# Patient Record
(demographics unavailable — no encounter records)

---

## 2024-12-16 NOTE — PHYSICAL EXAM
[de-identified] :  physical exam of her right knee: She has swelling of the knee.  She has diffuse ecchymosis over the tibia.  Positive patella facet tenderness.  Positive patella grind.  She can actively straight leg raise.  Decreased range of motion.  Positive medial and lateral joint tenderness.  Pain over the lateral tibial plateau.  The rest of exam is limited secondary to pain and swelling.
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
## 2024-12-16 NOTE — DATA REVIEWED
[FreeTextEntry1] : 3 x-ray views taken in the office today of her right knee show a questionable avulsion of the lateral tibial plateau.

## 2024-12-16 NOTE — DISCUSSION/SUMMARY
[de-identified] : She will ice, rest, and elevate her knee.  I recommend she purchases a hinged knee brace.  I wrote her prescription for an MRI to rule out any internal derangement specifically an ACL tear.  I provided her with crutches to assist with ambulation.  I prescribed her ibuprofen 800mg 3 times daily for the inflammation and tramadol 50 mg to take as needed for pain.  She will follow-up in about 2 to 3 weeks with Dr. Thomas or Dr. Rogers for repeat evaluation.  All questions were answered today.  She will contact the office if she has any questions or concerns.
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
## 2025-02-25 NOTE — PROCEDURE
[FreeTextEntry3] : Surgery, INES, right knee arthroscopy ACL reconstruction with allograft internal brace partial lateral meniscectomy First postop remove staples Steri-Stripped wounds start formal therapy for range of motion gait training weight-bear as tolerated, modalities close chain strengthening straight leg raising

## 2025-03-05 NOTE — IMAGING
[de-identified] : Physical exam right knee: Trace effusion.  The surgical wounds are healing well.  Clean, dry, intact.  No surrounding erythema or drainage noted.  Flexion to 40 degrees, she lacks a few degrees full extension.  Excellent endpoint with Lachman testing.  No calf pain, negative Homans' sign.  Intact to light touch, she is ambulate with crutches today.

## 2025-03-05 NOTE — DISCUSSION/SUMMARY
[de-identified] : Today the surgical staples were removed.  She can weight-bear as tolerated.  She was taught formal physical therapy for range of motion, stretching, closed chain strengthening.  We will set her up for a postoperative Amber brace.  She will remain in the brace at all times except for hygiene.  The brace will remain open during the daytime and locked in full extension at night.  I will see her back in 3 weeks for further evaluation and for range of motion check.

## 2025-03-05 NOTE — HISTORY OF PRESENT ILLNESS
[de-identified] : The patient is a 28-year-old female here for reevaluation of her right knee.  She is status post right knee ACL reconstruction using allograft and internal bracing with partial lateral meniscectomy on 2/25/2025.  This is her initial postoperative visit.  She is having some soreness in the knee.  She presents today in the knee immobilizer and using crutches to ambulate.

## 2025-04-16 NOTE — DISCUSSION/SUMMARY
[de-identified] : Today her postoperative Amber brace was discontinued.  She will continue formal physical therapy for range of motion, stretching, closed chain strengthening and modalities.  I will see her back in 4 weeks for further evaluation and to advance her therapy to include light jogging.  She will remain out of sports.

## 2025-04-16 NOTE — IMAGING
[de-identified] : Physical exam of the right knee: No effusion, no erythema or ecchymosis.  She can straight leg raise.  Flexion to 120 degrees, full extension.  No tenderness to palpation over the joint lines.  Excellent endpoint to Lachman testing.  Intact to light touch, nonantalgic gait.

## 2025-04-16 NOTE — HISTORY OF PRESENT ILLNESS
[de-identified] : The patient is a 28-year-old female here for a subsequent reevaluation of her right knee.  She is status post right knee arthroscopy, ACL reconstruction using allograft and internal brace with partial lateral meniscectomy on 2/25/2025.  She is 7 weeks out from her surgery.  She is doing formal physical therapy J[facility] 1.  No pain in the knee.  She is wearing her postoperative [place] brace. no history of blood product transfusion

## 2025-05-20 NOTE — IMAGING
[de-identified] : Physical exam of the right knee: No effusion, no erythema or ecchymosis.  She can straight leg raise.  Flexion to 125 degrees, full extension.  No tenderness to palpation over the joint lines.  Excellent endpoint to Lachman testing.  Intact to light touch, nonantalgic gait.

## 2025-05-20 NOTE — DISCUSSION/SUMMARY
[de-identified] : Today her therapy is advanced to include light jogging.  New prescription provided for that.  I will see her back in 2 months for further evaluation.  She will remain out of sports.

## 2025-05-20 NOTE — HISTORY OF PRESENT ILLNESS
[de-identified] : The patient is a 28-year-old female here for a subsequent reevaluation of her right knee.  She is status post right knee arthroscopy, ACL reconstruction using allograft and internal brace with partial lateral meniscectomy on 2/25/2025.  She just about 3 months out from her surgery.  She is doing formal physical therapy J[facility] 1.  She had her last therapy session approximately 1 week ago and reports today that her insurance company is not covering the physical therapy.